# Patient Record
Sex: MALE
[De-identification: names, ages, dates, MRNs, and addresses within clinical notes are randomized per-mention and may not be internally consistent; named-entity substitution may affect disease eponyms.]

---

## 2017-12-06 ENCOUNTER — HOSPITAL ENCOUNTER (EMERGENCY)
Dept: HOSPITAL 42 - ED | Age: 32
Discharge: HOME | End: 2017-12-06
Payer: MEDICAID

## 2017-12-06 VITALS
HEART RATE: 85 BPM | SYSTOLIC BLOOD PRESSURE: 119 MMHG | RESPIRATION RATE: 16 BRPM | TEMPERATURE: 97.6 F | OXYGEN SATURATION: 100 % | DIASTOLIC BLOOD PRESSURE: 86 MMHG

## 2017-12-06 VITALS — BODY MASS INDEX: 25.8 KG/M2

## 2017-12-06 DIAGNOSIS — S61.212A: Primary | ICD-10-CM
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
## 2017-12-06 DIAGNOSIS — Z23: ICD-10-CM

## 2017-12-06 NOTE — ED PDOC
Arrival/HPI





- General


Time Seen by Provider: 12/06/17 17:25


Historian: Patient





- History of Present Illness


Narrative History of Present Illness (Text): 





12/06/17 17:52











This is a 33 yo male with no past medical hx presenting with right finger 

laceration. The laceration happened 30 mins prior to arrival. It is on the 

volar tip of the right third finger. Pt reports bleeding for about 10 mins. He 

denies recent tetanus shot. He says it happened at work when he cut himself 

with a razor. He was laying down some tiles. Says the pain is a mild throbbing 

now.





PMH: None





PSH: none





Allergies: NKDA





FH: Non contributory





Home meds: none





Social hx: Current smoker. 1/2 ppd. social drinking, drug use. born in . 


Time/Duration: Prior to Arrival


Symptom Onset: Sudden


Symptom Course: Unchanged


Quality: Throbbing


Severity Level: Mild


Context: Work





Past Medical History





- Provider Review


Nursing Documentation Reviewed: Yes





- Travel History


Have you recently traveled outside US w/in the past 3 mons?: No





- Past History


Past History: No Previous





- Infectious Disease


Hx of Infectious Diseases: None





- Tetanus Immunization


Tetanus Immunization: Unknown





- Psychiatric


Hx Depression: No


Hx Emotional Abuse: No


Hx Physical Abuse: No


Hx Substance Use: No





- Anesthesia


Hx Anesthesia: No





- Suicidal Assessment


Feels Threatened In Home Enviroment: No





Family/Social History





- Physician Review


Nursing Documentation Reviewed: Yes


Family/Social History: Unknown Family HX


Smoking Status: Heavy Smoker > 10 Cigarettes Daily


Hx Alcohol Use: Yes (Socially)


Hx Substance Use: No


Hx Substance Use Treatment: No





Allergies/Home Meds


Allergies/Adverse Reactions: 


Allergies





seafood Allergy (Uncoded 12/06/17 17:40)


 ANAPHYLAXIS











Review of Systems





- Review of Systems


Constitutional: absent: Fatigue, Weight Change


Eyes: absent: Vision Changes, Photophobia


ENT: absent: Hearing Changes, Tinnitus


Respiratory: absent: SOB, Cough


Cardiovascular: absent: Chest Pain, Palpitations


Gastrointestinal: absent: Abdominal Pain, Stool Changes


Genitourinary Male: absent: Dysuria, Frequency


Musculoskeletal: absent: Arthralgias, Back Pain


Skin: absent: Rash, Pruritis


Neurological: absent: Headache, Dizziness


Endocrine: absent: Diaphoresis, Polyuria


Hemo/Lymphatic: absent: Adenopathy, Easy Bleeding


Psychiatric: absent: Anxiety, Depression





Physical Exam


Vital Signs











  Temp Pulse Resp BP Pulse Ox


 


 12/06/17 17:41  97.6 F  85  16  119/86  100











Appearance: Positive for: Well-Appearing


Mental Status: Positive for: Alert and Oriented X 3





- Systems Exam


Head: Present: Atraumatic, Normocephalic


Pupils: Present: PERRL


Extroacular Muscles: Present: EOMI


Mouth: Present: Moist Mucous Membranes


Neck: Present: Normal Range of Motion


Respiratory/Chest: Present: Clear to Auscultation


Cardiovascular: Present: Regular Rate and Rhythm, Normal S1, S2


Abdomen: No: Tenderness, Distention, Peritoneal Signs


Upper Extremity: Present: Normal Inspection.  No: Cyanosis, Edema


Lower Extremity: Present: Normal Inspection.  No: Edema


Neurological: Present: CN II-XII Intact, Speech Normal


Skin: Present: Laceration (small laceration volar tip right third finger; skin 

well approximated)


Psychiatric: Present: Alert, Oriented x 3, Normal Insight, Normal Concentration





Medical Decision Making


ED Course and Treatment: 





12/06/17 18:37











-gave tetanus shot


-irrigated wound


-applied dressing and steri strips to wound





- Medication Orders


Current Medication Orders: 











Discontinued Medications





Tetanus/Reduced Diphtheria/Acell Pertussis (Boostrix Vaccine Inj)  0.5 ml IM 

.ONCE ONE


   Stop: 12/06/17 17:52











Disposition/Present on Arrival





- Present on Arrival


Any Indicators Present on Arrival: No


History of DVT/PE: No


History of Uncontrolled Diabetes: No


Urinary Catheter: No


History of Decub. Ulcer: No


History Surgical Site Infection Following: None





- Disposition


Have Diagnosis and Disposition been Completed?: Yes


Diagnosis: 


 Finger laceration





Disposition: HOME/ ROUTINE


Disposition Time: 18:30


Patient Plan: Discharge


Patient Problems: 


 Current Active Problems











Problem Status Onset


 


Finger laceration Acute  











Condition: STABLE


Discharge Instructions (ExitCare):  Finger Laceration (ED)


Additional Instructions: 


Please return if condition worsens.





Please follow up with PMD. 








Referrals: 


Sharon Thornton, [Primary Care Provider] - Follow up with primary


Forms:  [employer] (English)

## 2018-01-29 ENCOUNTER — HOSPITAL ENCOUNTER (EMERGENCY)
Dept: HOSPITAL 42 - ED | Age: 33
LOS: 1 days | Discharge: HOME | End: 2018-01-30
Payer: MEDICAID

## 2018-01-29 VITALS — DIASTOLIC BLOOD PRESSURE: 80 MMHG | HEART RATE: 68 BPM | TEMPERATURE: 98.1 F | SYSTOLIC BLOOD PRESSURE: 117 MMHG

## 2018-01-29 VITALS — OXYGEN SATURATION: 97 % | RESPIRATION RATE: 18 BRPM

## 2018-01-29 VITALS — BODY MASS INDEX: 20.7 KG/M2

## 2018-01-29 DIAGNOSIS — S83.92XA: Primary | ICD-10-CM
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
## 2018-01-29 PROCEDURE — 99284 EMERGENCY DEPT VISIT MOD MDM: CPT

## 2018-01-29 PROCEDURE — 73560 X-RAY EXAM OF KNEE 1 OR 2: CPT

## 2018-01-29 PROCEDURE — 96372 THER/PROPH/DIAG INJ SC/IM: CPT

## 2018-01-29 NOTE — ED PDOC
Arrival/HPI





- General


Chief Complaint: Trauma


Time Seen by Provider: 01/29/18 20:06


Historian: Patient





- History of Present Illness


Narrative History of Present Illness (Text): 





01/29/18 23:23


Pt is a 34 yo M c/o left knee pain x 1 day. Pt describes slipping and falling 

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 As he landed

, he reports falling back, twisting his left knee and landing on both elbows. 

Pt denies head trauma or elbow pain. Reports the pain has caused some altered 

sensation to the distal leg. He did not apply ice or take ibuprofen at the 

time. Denies LOC, syncope,dizziness, leg weakness. 


01/30/18 11:36





Time/Duration: 24 hours


Symptom Onset: Sudden


Symptom Course: Unchanged


Quality: Aching, Pressure


Severity Level: Moderate


Activities at Onset: Rest, Light


Context: Sitting, Standing, Walking





Past Medical History





- Provider Review


Nursing Documentation Reviewed: Yes





- Travel History


Have you recently traveled outside US w/in the past 3 mons?: No





- Past History


Past History: No Previous





- Infectious Disease


Hx of Infectious Diseases: None





- Tetanus Immunization


Tetanus Immunization: Unknown





- Psychiatric


Hx Depression: No


Hx Emotional Abuse: No


Hx Physical Abuse: No


Hx Substance Use: No





- Anesthesia


Hx Anesthesia: No





- Suicidal Assessment


Feels Threatened In Home Enviroment: No





Family/Social History





- Physician Review


Nursing Documentation Reviewed: Yes


Family/Social History: No Known Family HX


Smoking Status: Heavy Smoker > 10 Cigarettes Daily


Hx Alcohol Use: Yes (Socially)


Frequency of alcohol use: Socially


Hx Substance Use: No


Hx Substance Use Treatment: No





Allergies/Home Meds


Allergies/Adverse Reactions: 


Allergies





seafood Allergy (Uncoded 12/06/17 17:40)


 ANAPHYLAXIS











Review of Systems





- Review of Systems


Constitutional: Normal


Eyes: Normal


ENT: Normal


Respiratory: Normal


Cardiovascular: Normal


Gastrointestinal: Normal


Genitourinary Male: Normal


Musculoskeletal: Joint Swelling (left knee)


Skin: Normal


Neurological: Normal


Endocrine: Normal


Hemo/Lymphatic: Normal


Psychiatric: Normal





Physical Exam


Vital Signs Reviewed: Yes


Vital Signs











  Temp Pulse Resp BP Pulse Ox


 


 01/29/18 23:29  98.1 F  68  18  117/80  97


 


 01/29/18 20:06  98.5 F  83  18  122/78  97











Temperature: Afebrile


Blood Pressure: Normal


Pulse: Regular


Respiratory Rate: Normal


Appearance: Positive for: Non-Toxic, Comfortable


Pain Distress: None


Mental Status: Positive for: Alert and Oriented X 3





- Systems Exam


Head: Present: Atraumatic, Normocephalic


Pupils: Present: PERRL


Extroacular Muscles: Present: EOMI


Conjunctiva: Present: Normal


Mouth: Present: Moist Mucous Membranes


Neck: Present: Normal Range of Motion


Respiratory/Chest: Present: Clear to Auscultation, Good Air Exchange.  No: 

Respiratory Distress, Accessory Muscle Use


Cardiovascular: Present: Regular Rate and Rhythm, Normal S1, S2.  No: Murmurs


Abdomen: Present: Normal Bowel Sounds.  No: Tenderness, Distention, Peritoneal 

Signs


Back: Present: Normal Inspection


Upper Extremity: Present: Normal Inspection.  No: Cyanosis, Edema


Lower Extremity: Present: Normal Inspection, NORMAL PULSES, Tenderness (over 

left patella), Swelling (mild infrapatellar edema), Neurovascularly Intact.  No

: Edema


Neurological: Present: GCS=15, CN II-XII Intact, Speech Normal


Skin: Present: Warm, Dry, Normal Color.  No: Rashes


Psychiatric: Present: Alert, Oriented x 3, Normal Insight, Normal Concentration





Medical Decision Making


ED Course and Treatment: 





01/29/18 23:29


Impression:


Pt is a 34 yo M c/o left knee pain x 1 day.





Plan:


knee xr 2 views





Progress Note:


toradol 15 mg im for pain and inflammation 


ace wrap for left knee


Dispo home with ibuprofen 600 mg po q6 prn


advised to follow up w primary for PT











- RAD Interpretation


Narrative RAD Interpretations (Text): 





01/29/18 23:30


Knee XR unremarkable


Radiology Orders: 








01/29/18 21:52


KNEE LEFT 2 VIEWS (AP & LAT) [RAD] Stat 











: Radiologist





- Medication Orders


Current Medication Orders: 











Discontinued Medications





Ketorolac Tromethamine (Toradol)  15 mg IM STAT STA


   Stop: 01/29/18 23:21


   Last Admin: 01/30/18 00:14  Dose: 15 mg





MAR Pain Assessment


 Document     01/30/18 00:14  YP  (Rec: 01/30/18 00:14  YP  [facility] [place]


     Pain Reassessment


      Is this a pain reassessment?               No


     Sleep


      Is patient sleeping during reassessment?   No


     Presence of Pain


      Presence of Pain                           Yes


IM Administration Charges


 Document     01/30/18 00:14  YP  (Rec: 01/30/18 00:14  YP  [facility] [place]


     Injection Site


      MAR Injection Site                         Left Deltoid


     Charges for Administration


      # of IM Administrations                    1














Disposition/Present on Arrival





- Present on Arrival


Any Indicators Present on Arrival: Yes


History of DVT/PE: No


History of Uncontrolled Diabetes: No


Urinary Catheter: No


History of Decub. Ulcer: No


History Surgical Site Infection Following: None





- Disposition


Have Diagnosis and Disposition been Completed?: Yes


Diagnosis: 


 Left knee sprain





Disposition: HOME/ ROUTINE


Disposition Time: 23:35


Patient Plan: Discharge


Condition: STABLE


Discharge Instructions (ExitCare):  Knee Sprain (ED)


Additional Instructions: 


Please take ibuprofen tp manage your pain and inflammation. You can use ice and 

elevation of knee as needed. Please follow up with your doctor in the next 48 

hrs to further evaluation and physical therapy if needed.








Prescriptions: 


Ibuprofen [Motrin Tab] 600 mg PO Q6 5 Days #20 tab


Referrals: 


Cathryn Park MD [Primary Care Provider] - Follow up with primary


Forms:  [employer] (English)

## 2019-01-25 ENCOUNTER — HOSPITAL ENCOUNTER (EMERGENCY)
Dept: HOSPITAL 42 - ED | Age: 34
Discharge: HOME | End: 2019-01-25
Payer: MEDICAID

## 2019-01-25 VITALS — DIASTOLIC BLOOD PRESSURE: 78 MMHG | OXYGEN SATURATION: 99 % | HEART RATE: 68 BPM | SYSTOLIC BLOOD PRESSURE: 116 MMHG

## 2019-01-25 VITALS — RESPIRATION RATE: 18 BRPM | TEMPERATURE: 98 F

## 2019-01-25 VITALS — BODY MASS INDEX: 21.4 KG/M2

## 2019-01-25 DIAGNOSIS — F17.210: ICD-10-CM
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
## 2019-01-25 DIAGNOSIS — S00.93XA: Primary | ICD-10-CM

## 2019-01-25 NOTE — ED PDOC
Arrival/HPI





- General


Chief Complaint: Trauma


Time Seen by Provider: 01/25/19 12:53


Historian: Patient





- History of Present Illness


Narrative History of Present Illness (Text): 





01/25/19 13:18





A 34 year old male presents to the emergency department with a complaint of 

right sided head pain. Patient reports that he was working yesterday when an 80 

lb door hit him in the head at around 10 or 11 am. The patient states that since

then he has been experiencing intermittent episodes of dizziness. He reports 

tenderness to the right side of the head. Patient denies LOC, fevers, chills, 

chest pain, shortness of breath, dyspnea on exertion, cough, abdominal pain, 

nausea, vomiting, diarrhea, back pain, neck pain, urinary/bowel changes, or any 

other complaint.





PMD: None


Time/Duration: Other (Yesterday)


Symptom Onset: Sudden


Symptom Course: Intermittent


Activities at Onset: Rest, Light


Context: Work





Past Medical History





- Provider Review


Nursing Documentation Reviewed: Yes





- Past History


Past History: No Previous





- Infectious Disease


Hx of Infectious Diseases: None





- Tetanus Immunization


Tetanus Immunization: Unknown





- Psychiatric


Hx Depression: No


Hx Emotional Abuse: No


Hx Physical Abuse: No


Hx Substance Use: No





- Anesthesia


Hx Anesthesia: No





- Suicidal Assessment


Feels Threatened In Home Enviroment: No





Family/Social History





- Physician Review


Nursing Documentation Reviewed: Yes


Family/Social History: No Known Family HX


Smoking Status: Heavy Smoker > 10 Cigarettes Daily


Hx Alcohol Use: Yes (Socially)


Hx Substance Use: No


Hx Substance Use Treatment: No





Allergies/Home Meds


Allergies/Adverse Reactions: 


Allergies





seafood Allergy (Uncoded 12/06/17 17:40)


   ANAPHYLAXIS











Review of Systems





- Physician Review


All systems were reviewed & negative as marked: Yes





- Review of Systems


Constitutional: absent: Fevers


Respiratory: absent: SOB, Cough


Cardiovascular: absent: Chest Pain, OLEA


Gastrointestinal: absent: Abdominal Pain, Stool Changes, Diarrhea, Nausea, 

Vomiting


Genitourinary Male: absent: Urinary Output Changes


Musculoskeletal: absent: Back Pain, Neck Pain


Neurological: Headache (Right sided head pain), Dizziness





Physical Exam


Vital Signs Reviewed: Yes





Vital Signs











  Temp Pulse Resp BP Pulse Ox


 


 01/25/19 13:01  98.0 F  83  18  131/79  98











Temperature: Afebrile


Blood Pressure: Normal


Pulse: Regular


Respiratory Rate: Normal


Appearance: Positive for: Well-Appearing, Non-Toxic, Comfortable


Pain Distress: None


Mental Status: Positive for: Alert and Oriented X 3





- Systems Exam


Head: Present: Normocephalic, Tenderness (Mild tenderness to right temporal 

area.)


Pupils: Present: PERRL


Extroacular Muscles: Present: EOMI


Conjunctiva: Present: Normal


Mouth: Present: Moist Mucous Membranes


Neck: Present: Normal Range of Motion


Respiratory/Chest: Present: Clear to Auscultation, Good Air Exchange.  No: 

Respiratory Distress, Accessory Muscle Use


Cardiovascular: Present: Regular Rate and Rhythm, Normal S1, S2.  No: Murmurs


Abdomen: No: Tenderness, Distention, Peritoneal Signs


Back: Present: Normal Inspection


Upper Extremity: Present: Normal Inspection.  No: Cyanosis, Edema


Lower Extremity: Present: Normal Inspection.  No: Edema


Neurological: Present: GCS=15, CN II-XII Intact, Speech Normal


Skin: Present: Warm, Dry, Normal Color.  No: Rashes


Psychiatric: Present: Alert, Oriented x 3, Normal Insight, Normal Concentration





Medical Decision Making


ED Course and Treatment: 





01/25/19 13:23





Impression:


A 34 year old male presents to the emergency department with a complaint of 

intermittent episodes of dizziness s/p head injury at work yesterday.





Plan:


-- Head CT


-- Reassess and disposition








Progress Notes:








PROCEDURE: CT HEAD WITHOUT CONTRAST


Signed By : Charley Stark MD


Signed Date : 01/25/2019 1500


IMPRESSION: No evidence of acute intracranial hemorrhage intracranial collection

mass effect or midline shift. No evidence of acute calvarial fracture. 








01/25/19 15:19


On re-evaluation, patient feels better and is in no acute distress. I have 

discussed the results and plan with the patient, who expresses understanding. 

Patient in agreement with plan to be discharged home. Patient is stable for 

discharge. Patient was instructed to follow up with physician or return if 

symptoms worsen or new concerning symptoms arise.








- RAD Interpretation


Radiology Orders: 











01/25/19 13:15


HEAD W/O CONTRAST [CT] Stat 














- Scribe Statement


The provider has reviewed the documentation as recorded by the Scribe





Sanam Flores 





Provider Scribe Attestation:


All medical record entries made by the Scribe were at my direction and 

personally dictated by me. I have reviewed the chart and agree that the record 

accurately reflects my personal performance of the history, physical exam, 

medical decision making, and the department course for this patient. I have also

 personally directed, reviewed, and agree with the discharge instructions and 

disposition.








Disposition/Present on Arrival





- Present on Arrival


Any Indicators Present on Arrival: No


History of DVT/PE: No


History of Uncontrolled Diabetes: No


Urinary Catheter: No


History of Decub. Ulcer: No


History Surgical Site Infection Following: None





- Disposition


Have Diagnosis and Disposition been Completed?: Yes


Diagnosis: 


 Head contusion





Disposition: HOME/ ROUTINE


Disposition Time: 15:05


Condition: GOOD


Discharge Instructions (ExitCare):  Minor Head Injury (DC)


Additional Instructions: 





JACK SANDERS, thank you for letting us take care of you today. The emergency 

medical care you received today was directed at your acute symptoms. If you were

 prescribed any medication, please fill it and take as directed. It may take 

several days for your symptoms to resolve. Return to the Emergency Department if

 your symptoms worsen, do not improve, or if you have any other problems.





Please contact your doctor or call one of the physicians/clinics you have been 

referred to that are listed on the Patient Visit Information form that is 

included in your discharge packet. Bring any paperwork you were given at 

discharge with you along with any medications you are taking to your follow up 

visit. Our treatment cannot replace ongoing medical care by a primary care 

provider outside of the emergency department.





Thank you for allowing the [facility] team to be part of your care today.














Follow up with the clinic next week for outpatient care.











Return to the emergency room if you have any concerns.


Prescriptions: 


Ibuprofen [Motrin] 600 mg PO Q6 PRN #20 tab


 PRN Reason: Pain, Moderate (4-7)


Referrals: 


 Service [Outside] - Follow up with primary


Cathryn Park MD [Medical Doctor] - Follow up with primary


Forms:  [employer] (English)

## 2019-01-25 NOTE — CT
Date of service: 



01/25/2019



PROCEDURE:  CT HEAD WITHOUT CONTRAST.



HISTORY:

direct trauma - r/o ICH and fx



COMPARISON:

Comparison is made with the previous study dated 06/03/2012



TECHNIQUE:

Axial computed tomography images were obtained through the head/brain 

without intravenous contrast.  



Radiation dose:



Total exam DLP = 884.28 mGy-cm.



This CT exam was performed using one or more of the following dose 

reduction techniques: Automated exposure control, adjustment of the 

mA and/or kV according to patient size, and/or use of iterative 

reconstruction technique.



FINDINGS:



HEMORRHAGE:

No intracranial hemorrhage. 



BRAIN:

No mass effect or edema.  No atrophy or chronic microvascular 

ischemic changes.



VENTRICLES:

Unremarkable. No hydrocephalus. 



CALVARIUM:

Unremarkable.



PARANASAL SINUSES:

Unremarkable as visualized. No significant inflammatory changes.



MASTOID AIR CELLS:

Unremarkable as visualized. No inflammatory changes.



OTHER FINDINGS:

None.



IMPRESSION:

No evidence of acute intracranial hemorrhage intracranial collection 

mass effect or midline shift.  No evidence of acute calvarial 

fracture.